# Patient Record
(demographics unavailable — no encounter records)

---

## 2019-05-29 NOTE — OP
DATE OF PROCEDURE:  05/29/2019



PREOPERATIVE DIAGNOSIS:  A 71-year-old female with history of high-grade

transitional cell carcinoma of the bladder, status post transurethral resection 
of a

bladder tumor with history of lamina propria invasion, negative for muscle 
invasion. 



POSTOPERATIVE DIAGNOSIS:  Left lateral bladder tumor recurrence about 2.5 cm 
lateral

to the left ureteral orifice. 



PROCEDURE PERFORMED:  Cystoscopy, transurethral resection of bladder tumor,

intravesical mitomycin-C. 



ANESTHESIA:  General.



COMPLICATIONS:  None apparent.



DISPOSITION:  To recovery room in stable condition.



IV FLUIDS:  800 mL.



EBL:  Minimal.



SPECIMEN:  TUR of lateral bladder tumor.

1. Superficial.

2. Deep.



INTRAOPERATIVE FINDINGS:  Bilateral UOs with clear efflux of urine pre and post

TURBT, mildly patulous bilaterally.  Solitary left lateral bladder tumor with 
smooth

surface, exophytic, measuring about 2.5 cm lateral to the left UO.  No other 
lesions

found. 



INDICATIONS FOR PROCEDURE AND HISTORY:  Ms. Molina is a 71-year-old female, 
who

initially presented last year with obstructing UVJ stone, urosepsis, DIC.  She 
was

monitored in the ICU.  She underwent bilateral ureteroscopy, laser lithotripsy, 
and

stone resolution.  Incidentally on cystoscopy, she was found to have trigonal 
lesion

and underwent TURBT, subsequently after her urosepsis resolved.  This 
demonstrated

high-grade T1 lamina propria invasion bladder tumor.  She declined BCG 
treatment and

previously had discussed regarding indications due to high-grade bladder tumor.

Last cystoscopy demonstrated a bladder lesion.  Therefore, advised regarding 
TURBT.

Cytology obtained was hypocellular.  Indications for surgery reviewed with her 
in

detail and risks and complications including, but not limited to, bleeding, pain
,

infection, injury to adjacent organs, clot retention, bladder perforation, 
injury to

ureter, adjacent structures.  Questions answered to her satisfaction and she 
desired

to proceed. 



DESCRIPTION OF PROCEDURE:  After an informed consent was signed, the patient was

taken to the operating room, placed in a dorsal lithotomy position with the 
genital

area prepped and draped in the usual surgical sterile fashion.  A 21-Lithuanian

cystoscope was utilized for cystoscopy with the 30 and a 70-degree lens.  I only

found the previous left lateral bladder tumor about 2.5 cm left lateral wall, 
which

appeared to have a smooth surface.  The bladder tumor was approximately 5 to 6 
mm

lateral to the left UO.  Bilateral clear efflux of urine was noted.  At this 
time,

we transitioned to a 26-Lithuanian resectoscope and TURBT of the bladder tumor was

performed.  I initially resected the tumor flush to the bladder mucosa.  This 
was

sent as superficial, and subsequently, we obtained some deep specimen for muscle

specimen.  It did not have a typical appearance of a papillary lesion.  Good

hemostasis was obtained.  Bilateral clear efflux of urine was noted, status post

transurethral resection of bladder tumor.  At this time, we placed a 20-Lithuanian 3
-way

Jenkins catheter, 30 mL insufflated.  Bladder was completely emptied.  We then

subsequently placed 40 mg of mitomycin-C and 40 mL of water into the bladder.  
She

will be monitored in PACU with retention for 1 hour and initiate CBI.  
Anticipate

the patient will be discharged after CBI discontinued and monitor for degree of

hematuria.  She is discharged with short course of Loman 5/325, #20; Colace one 
p.o.

b.i.d., Azo p.r.n., ciprofloxacin until followup appointment, oxybutynin 10 mg 
XL

one p.o. daily.  She will follow up with me next Thursday for catheter removal 
and

to review pathology. 







Job ID:  [id number]



[facility]

## 2019-07-31 NOTE — OP
DATE OF PROCEDURE:  07/31/2019



PREOPERATIVE DIAGNOSIS:  A 72-year-old female with history of high-grade

transitional cell carcinoma of the bladder, status post transurethral resection 
of

bladder tumor, pathologic T1, recent recurrence left lateral wall, 2.5 cm,

demonstrating recurrence of high-grade T1 disease. 



POSTOPERATIVE DIAGNOSIS:  A 72-year-old female with history of high-grade

transitional cell carcinoma of the bladder, status post transurethral resection 
of

bladder tumor, pathologic T1, recent recurrence left lateral wall, 2.5 cm,

demonstrating recurrence of high-grade T1 disease. 



PROCEDURES PERFORMED:  Cystoscopy, catheterization of the left ureter, 
transurethral

resection of bladder tumor, resection of bladder tumor bed, intravesical post

transurethral resection of bladder tumor mitomycin-C instillation. 



ANESTHESIA:  General.



COMPLICATIONS:  None apparent.



DISPOSITION:  To recovery room in stable condition.



SPECIMEN:  TUR of tumor bed, deep.



INDICATIONS FOR PROCEDURE AND HISTORY:  Ms. Molina is a 71-year-old female, 
who

initially presented with severe sepsis DIC, had found to have a UVJ stone.  She

underwent treatment of her stones, incidentally was found to have a trigonal 
lesion,

underwent TURBT demonstrating high-grade T1 lamina propria and invasive bladder

tumor.  She declined BCG treatment.  She has been surveyed with 3-month interval

cystoscopy.  Her last cystoscopy demonstrated lesion recurrence and underwent 
TURBT,

May 29, 2019.  This demonstrated T1 disease, lamina propria invasion; however, 
no

muscle was found.  Therefore, she presents today for restaging TURBT.  
Indications

for restaging reviewed and she desired to proceed.  Risks, complications, and

indications were reviewed with her in detail.  Risks and complications including
,

but not limited to:  Bleeding, pain, infection, injury to adjacent organs, 
bladder

perforation, sepsis, ureteral injury, hematuria, clot retention were reviewed, 
and

she desired to proceed. 



DESCRIPTION OF PROCEDURE:  After an informed consent was signed, the patient was

taken to the operating room, placed in a dorsal lithotomy position with the 
genital

area prepped and draped in the usual surgical sterile fashion.  A 21-Nigerien

cystoscope with a 30- and 70-degree lens were utilized to survey the bladder.  
Upon

entering the bladder, the UOs were identified, mildly patulous bilaterally and 
clear

efflux was noted.  At the left lateral wall, about 1 cm lateral to the left UO,

previous resection site was noted with granulomatous reaction.  I did not see 
any

obvious tumor recurrence.  At this time, I intubated the left UO, to protect the

left ureteral orifice in the intramural ureter as the tumor course projects in 
that

region.  With the open-ended catheter intubated in the left UO, I was easily 
able to

pass a 26-Nigerien resectoscope uneventfully.  Using a Gyrus bipolar, we took 
down the

previous resection site, which demonstrated granulomatous reaction.  Deep 
resection

was performed.  She is elderly, frail in nature, and her bladder is attenuated.
  I

did take muscle specimen, which was visually consistent with muscle fibers.  
Small

amount of perivesical fat was seen.  I did not aggressively resect further due 
to

her frail elderly nature and attenuated bladder mucosa.  Good hemostasis with 
gyrus

was obtained.  Clear efflux from bilateral UOs were noted after TUR performed.

Surface area that was resected, was about 2.5 cm.  We then placed a 20-Nigerien 3-
way

30 mL catheter on CBI.  CBI port was plugged, and I instilled 40 mg of mitomycin
-C

and 40 mL of water, and we will hold retention for 1 hour and postop and 
monitor her

on CBI.  If urine output clear off CBI, she will be discharged. 



She will follow up with me next Wednesday for specimen review and pathology.  
She

was discharged with Colace 100 mg 1 p.o. b.i.d.; Macrobid 100 mg one p.o. 
b.i.d. x7

days to start August 1; oxybutynin 10 mg one p.o. daily XL #30 provided for 
bladder

spasm. 







Job ID:  [id number]



[facility]

## 2019-07-31 NOTE — RAD
RETROGRADE PYELOGRAM:

 

Date:  07/31/19 

 

HISTORY:  

Malignant neoplasm of bladder, unspecified. 

 

COMPARISON:  

11/28/18. 

 

FINDINGS:

 film demonstrates no overt calculus. Documentation image of left ureteral stent placed up to ap
proximately the L4-5 level. 

 

IMPRESSION: 

Image documentation of a left ureteral stent with tip extending up to approximately the L4-5 level. 

 

 

POS: [facility]

## 2020-01-16 NOTE — CT
CT ABDOMEN WITH AND WITHOUT CONTRAST

CT PELVIS WITH AND WITHOUT CONTRAST:



DATE:

1/16/2020



HISTORY:

72-year-old female with bladder cancer, status post bladder scraping surgery x2. Follow-up.



COMPARISON:

1/4/2019



TECHNIQUE:

IV injection of iodinated contrast media: administered.

Oral contrast media:Not administered.

Precontrast scan, nephrographic-venous phase scan, and excretory/pyelographic phase scan, performed t
hrough entire abdomen and pelvis.

Coronal and sagittal reconstructions of excretory/pyelographic phase scan.



FINDINGS:

Diffuse, nonspecific mild mural thickening of urinary bladder, similar in appearance to previous CT. 
Uncertain how much of this is due to incomplete distention. No evidence of obvious moderate sized

or large neoplastic tumor in the urinary bladder.

No hydroureteronephrosis.

There has been interval growth of a previously punctate 1 mm calculus at left renal lower pole calyx,
 currently measuring 4 x 2 mm. Adjacent to that, there is a punctate 1 mm calculus. No calculus

identified in the contralateral right kidney, ureters, or bladder.

Absent uterus and absent gallbladder.

Small sliding hiatal hernia.

No high-grade hydronephrosis.

No primary renal neoplasm.

No abdominal aortic aneurysm.

Biconcave broad depressions of superior and inferior endplates throughout all visualized levels of amy
mbar spine and lower thoracic spine. Multilevel bilateral facet DJD in lumbar spine.

No ascites, pneumoperitoneum, colonic diverticulitis, or small bowel dilation.

No major pathology of abdominal aorta, liver, spleen. Bilateral adrenal calcifications unchanged. 1 c
m right adrenal adenoma.

Atrophic, fatty replaced pancreas.

Diffuse severe osteopenia..



IMPRESSION:

1) nonspecific appearance of the urinary bladder. No obvious bladder tumor recurrence identified.

2) nephrolithiasis with interval growth of a now 4 mm left renal calculus.

3) multiple osteoporotic compression and burst fractures throughout the lumbar spine, at least most o
f which are old.

4) small sliding hiatal hernia.

5) status post cholecystectomy and hysterectomy.



Reported By: Evan Paulino 

Electronically Signed:  1/16/2020 10:01 AM

## 2020-05-13 NOTE — OP
DATE OF PROCEDURE:  05/13/2020



PRIMARY CARE PHYSICIAN:  Dr. Humaira Walker.



PREOPERATIVE DIAGNOSIS:  A 72-year-old female with history of high-grade T1

transitional cell carcinoma of the bladder with recurrence. 



POSTOPERATIVE DIAGNOSIS:  A 72-year-old female with history of high-grade T1

transitional cell carcinoma of the bladder with recurrence. 



PROCEDURES PERFORMED:  Cystoscopy, transurethral resection of bladder tumor,

intravesical post transurethral resection mitomycin-C instillation. 



ANESTHESIA:  LMA.



COMPLICATIONS:  None apparent.



SPECIMEN:  TUR of bladder tumor, superficial, deep, sent separately.



ESTIMATED BLOOD LOSS:  Minimal.



INDICATIONS FOR PROCEDURE:  Ms. Molina is a 72-year-old female with history of

high-grade T1 TCC of the bladder.  I have informed her regarding indications 
for BCG

to decrease recurrence and progression, which she has continued to decline.  On

recent cystoscopy, she has had recurrence and presents today for TURBT.  Risks 
and

complications of procedure were reviewed with her in detail including, but not

limited to, bleeding, pain, infection, injury to adjacent organs, urosepsis, 
bladder

perforation, and ureteral or bladder injury discussed.  All questions answered 
to

her satisfaction and she desired to proceed. 



DESCRIPTION OF PROCEDURE:  After an informed consent was signed, the patient was

taken to the operating room, placed in a dorsal lithotomy position with the 
genital

area prepped and draped in the usual surgical sterile fashion.  A 21-Nepali

cystoscope was utilized with a 30 and a 70-degree lens.  The only solitary 
lesion we

saw was a right anterior lateral bladder tumor and lateral to the right UO.  
The UOs

were kept out of harm's way at all times.  At this time, we transitioned to a

continuous resectoscope sheath 26-Nepali with a visual obturator.  Subsequently,

using a Gyrus bipolar, we resected the bladder tumor, superficial and deep and 
sent

specimen separately.  The tumor appeared to be sessile; superficial, and deep

bladder resection was performed.  Due to her advanced age, her bladder was very

attenuated.  We did resect to the level of the bladder muscle and the muscle 
fibers

were seen with a deep resection.  I did not aggressively resect given its 
location

and her advanced age and attenuated bladder mucosa, but felt that we had enough

specimen for deep specimen.  Good hemostasis was obtained.  No bleeding was

appreciated with the water and pressure off.  At this time, a 20-Nepali 3-way 
Jenkins

catheter, 30 mL balloon, with 20 mL instilled was placed into the bladder.  CBI

tubing was attached, clamped for now and intravesical mitomycin-C 40 mg was

instilled into the bladder.  We will hold retention for hour, per protocol and 
start

CBI.  Subsequently, we will discharge the patient after mitomycin-C complete 
and no

significant gross hematuria of concern.  She is discharged with Omnicef for 7 
days,

Colace b.i.d., and p.r.n. Azo, she did have significant bladder spasms with 
previous

resection, she is currently on VESIcare 5 mg one p.o. b.i.d. by her PCP and 
feels

that it is working better.  She may continue to do so.  She will see me next 
Tuesday

to have catheter removal and review pathology. 







Job ID:  [id number]



MTDD

## 2020-05-19 NOTE — PQF
[facility]

POST DISCHARGE CLINICAL DOCUMENTATION IMPROVEMENT CLARIFICATION FORM 



 l



 Todays Date: 05/18/20

  l

Patients Name VALE CALDERON

MRN [id number]

  l

Acct # [id number]

  l

Admit Date 05/13/20

  l

Disch Date 05/13/20









  Name Randa Rowe

Email: [email or web address]

Cell: [phone] 

 

 





To be completed by : 







Present Clinical Indicators - Signs / Symptoms Results and Location in Medical 
Record 

 

[ ] Documentation of: 

 

[ ]  

 

[ ] Documentation of: 

 

[ ]  

 

[ ] Documentation of: 

 

[ ]  

 

[ ] Documentation of: 

 

[ ]  

 

[ ]  

 

Risks  

 

[ ]  

 

[ ]  

 

[ ]  

 

Treatment  

 

[ X] Bladder tumor: 2 cm 

 Query for size of resected bladder tumor (cm)

 

[ ]  

 

[ ]  













To be completed by Physician:  

GIA GORMAN   



The documentation in this patients record requires clarification to ensure 
coding compliance and accuracy. 



Check the appropriate box and include in your discharge summary. 

[ ] _____________________________________________

[ ] Please check this box if this does not apply to this patient

[ ] Unable to determine

[ ] Other diagnosis: ________________________________



Review the following information and exercise your independent professional 
judgment in responding to the clarification. Based upon the clinical findings, 
risk factors, and treatment, please clarify if you are treating one of the 
above probable or suspected diagnoses. 





Physician Signature: Jono Castro____________________________ Date________
5/29/2020______ Time________________
[facility]

## 2020-06-19 NOTE — RAD
XR Chest Pa   Lat STANDARD



History: Preop evaluation



Comparison: Chest CT January 2019



Findings: Continues to be dilatation of the ascending aorta and ectatic transverse aorta. Mild scarri
ng left lateral costophrenic sulcus. No confluent airspace consolidation, pneumothorax or effusion.

No acute osseous abnormality. Multiple wedge compression deformities thoracolumbar spine.



Impression: No acute intrathoracic abnormality. Chronic findings.



Reported By: Aleksandr Sellers 

Electronically Signed:  6/19/2020 2:23 PM

## 2020-06-24 NOTE — OP
DATE OF PROCEDURE:  06/24/2020



PREOPERATIVE DIAGNOSIS:  A 72-year-old female with history of high-grade T1

transitional cell carcinoma. 



POSTOPERATIVE DIAGNOSIS:  A 72-year-old female with history of high-grade T1

transitional cell carcinoma. 



PROCEDURES PERFORMED:  Cystoscopy, transurethral resection of the previous 
tumor bed.



ANESTHESIA:  General.



COMPLICATIONS:  None apparent.



DISPOSITION:  To recovery room in stable condition.



SPECIMEN:  TUR of previous tumor bed, right anterolateral wall, tumor bed 
resected

final surface area about 2-2.5 cm. 



INDICATIONS FOR PROCEDURE AND HISTORY:  Ms. Molina is a 72-year-old female, 
who is

well known to me with history of high-grade T1 TCC.  She had previously 
declined a

BCG intravesical therapy despite my recommendations.  As she presented initially

very frail, with urosepsis, the patient and family declined.  As she has had

recurrence, they are considering to proceed with BCG as I previously 
recommended.

As her previous recurrence was resected, demonstrating high-grade, she presents

today for deep tumor resection of the tumor bed.  Risks and complications of the

procedure were reviewed with her in detail including, but not limited to, 
bleeding,

pain, infection, injury to adjacent organs, bladder perforation, sepsis, all

questions answered to her satisfaction, and she desired to proceed. 



DESCRIPTION OF PROCEDURE:  After an informed consent was signed, the patient was

taken to the operating room, placed in a dorsal lithotomy position with the 
genital

area prepped and draped in the usual surgical sterile fashion.  A 21-Central African

cystoscope was utilized for cystoscopy, which demonstrated normal urethra.  Upon

entering the bladder, there was no evidence of visible tumor recurrence.  The 
UOs

were identified in normal orthotopic position.  The left UO was mildly patulous.

Using a 30-degree and a 70-degree lens, we confirmed that there was no tumor

recurrence.  The previous resection site was somewhat subtle as she was already

healed.  This was located in the right anterolateral wall, caudad to the right

ureteral orifice.  The UOs were well away from the resection site and kept out 
of

harm's way.  We then transitioned to a 26-Central African resectoscope gyrus bipolar.  We

took a resection from the previous tumor bed deep.  I was able to see the muscle

fibers and beginning to repair the perivesical fat indicating that we were deep

enough.  Her bladder was very attenuated, and then given her advanced age and I 
did

not further pursue as there was no recurrence and I felt that I got enough this

specimen.  The edges were cauterized with bipolar.  She tolerated the procedure

well.  An 18-Central African 10 mL urethral Jenkins catheter was attached to leg bag.  She 
is

discharged with Omnicef 300 mg one p.o. b.i.d. until followup appointment.  She 
will

return to clinic for Jenkins catheter removal in about 2 to 3 weeks.  We will 
initiate

her induction BCG. 







Job ID:  [id number]



MTDD

## 2020-12-03 NOTE — EKG
Test Reason : 

Blood Pressure : ***/*** mmHG

Vent. Rate : 088 BPM     Atrial Rate : 088 BPM

   P-R Int : 158 ms          QRS Dur : 086 ms

    QT Int : 384 ms       P-R-T Axes : 054 -29 019 degrees

   QTc Int : 464 ms

 

Normal sinus rhythm

Nonspecific ST abnormality

Abnormal ECG

When compared with ECG of 24-OCT-2020 05:00,

T wave inversion now evident in Inferior leads

Nonspecific T wave abnormality now evident in Anterior leads

T wave inversion no longer evident in Lateral leads

QT has shortened

Confirmed by DR. LAURIE FRAGA (3) on 12/3/2020 7:06:29 AM

 

Referred By:  JADIEL           Confirmed By:DR. LAURIE FRAGA

## 2020-12-07 NOTE — OP
DATE OF PROCEDURE:  12/07/2020



PREOPERATIVE DIAGNOSES:  

1. A 73-year-old female with history of transitional cell carcinoma of the 
bladder,

initially diagnosed in November 2018, high-grade T1, status post induction BCG. 

2. Left renal lithiasis:  8 mm renal pelvic, lower pole 6 to 7 mm on prior CT.

3. Recent CT demonstrating an 8 to 9 mm renal pelvic stone, left distal 2 mm UVJ

stone, left lower pole 3 mm to 4 mm calculi x2. 

4. Mild left hydronephrosis.

5. Right mild hydronephrosis on ultrasound, ruled out on CT.

6. Constipation.



POSTOPERATIVE DIAGNOSES:  

1. A 73-year-old female with history of transitional cell carcinoma of the 
bladder,

initially diagnosed in November 2018, high-grade, T1, status post induction, 
BCG. 

2. Left renal lithiasis:  8 mm renal pelvic, lower pole 6 to 7 mm on prior CT.

3. Recent CT demonstrating an 8 to 9 mm renal pelvic stone, left distal 2 mm UVJ

stone, left lower pole 3 mm to 4 mm calculi x2. 

4. Mild left hydronephrosis.

5. Right mild hydronephrosis on ultrasound, ruled out on CT.

6. Constipation.



PROCEDURES PERFORMED:  Cystoscopy, left retrograde pyelogram, left ureteroscopy,

laser lithotripsy of renal calculi multiple, basket extraction of stone 
fragments,

4.8 x 26 double-J ureteral stent with distal tail in situ, basket extraction of

stone debris, retrograde pyelogram,  fecal disimpaction. 



ANESTHESIA:  General.



COMPLICATIONS:  None apparent.



SPECIMEN:  Stone for chemical analysis.



INTRAOPERATIVE FINDINGS:  

1. No evidence of bladder tumor on cystoscopy using a 30 and 70-degree lens.

2. Bilateral UOs somewhat deviated mildly laterally.  Bilateral patulous UOs 
with

clear efflux. 

3. Low compliance bladder, bilateral reflux on retrograde pyelogram.

4. Multiple left renal calculi, dimensions as above, appear soft in nature.

5.  Fecal impaction



INDICATIONS FOR PROCEDURE AND HISTORY:  Ms. Molina is a 73-year-old female 
with

history of high-grade T1 TCC of the bladder status post TURBT, prior history of

kidney stones.  She has previously been undergoing observation with staging

cystoscopies, however, due to recurrence, she has finally agreed to proceed with
her

BCG, which she had declined in the past.  She has completed her induction BCG,

however, has difficulty finishing her maintenance #1 dose of BCG, which she 
finished

1/3 maintenance doses, however, difficulty tolerating due to severe bladder 
spasms

and low capacity bladder.  Staging CT demonstrated left renal calculi, 
dimensions as

above, and she presents today for treatment and cystoscopy surveillance for her

bladder tumor.  Risks and complications of the procedures have been fully 
discussed

with the patient including, but not limited to:  Bleeding; pain; infection; 
injury

to adjacent organs; urosepsis; ureteral, renal, kidney injury; stricture 
formation.

All questions were answered to her satisfaction and she desired to proceed. 



DESCRIPTION OF PROCEDURE:  After an informed consent was signed, the patient was

taken to the operating room, placed in a dorsal lithotomy position with the 
genital

area prepped and draped in the usual surgical sterile fashion.  The patient was

placed in dorsal lithotomy position and broad-spectrum antibiotics were 
provided.  A

21-[place] cystoscope was utilized for cystoscopy using a 30 and 70-degree lens,

which demonstrated no evidence of inflammatory lesion, no tumor seen.  There was

mild weepiness of the bladder mucosa with distention of the bladder.  However, 
there

was no erythema or papillary lesion concerning for bladder tumor recurrence.  
The

UOs were mildly deviated laterally, bilaterally.  Patulous UOs on both sides 
noted

with clear efflux of urine.  No stone in the bladder was seen.  At this time, an

open-ended catheter was utilized to intubate the left UO and a retrograde 
pyelogram

performed demonstrating reflux into the right collecting system with a filling 
of

the bladder, which was nondilated, and left retrograde pyelogram demonstrated 
mildly

patulous ureter, however, no filling defect and no significant hydronephrosis.

0.38 Sensor wire was placed into the left upper pole and a rigid ureteroscope

was advanced without significant issues.  I was able to pass the rigid 
ureteroscope

to the level of the UPJ, proximal ureter without significant issues.  No stones 
were

seen.  At this time 0.35 Super Stiff wire was placed into the left upper pole

and a dual-lumen access sheath was passed to the level of the proximal ureter.

After it was removed, a 12 x 14-[place] navigator was able to be placed into the

proximal ureter without difficulty.  Flexible ureteroscope was then advanced via
the

navigator and survey of the collecting system demonstrated the stone moiety

consistent with CT.  The stones appeared to be quite soft.  They fragmented very

easily into powder like dust debris.  Those that were amendable to be basket

extracted were removed.  What remained in situ were too small to basket or 
laser.

At this time, the survey of the collecting system demonstrated no evidence of

obvious stone nidus of concern, ureteral stone and trauma were ruled out.  A 4.8
x

26 double-J ureteral stent with distal tail was left in situ with good 
redundancy in

the bladder and the kidney.  As she had severe bladder spasms, 4.8-[place]

stent  placed.  I did place a B and O suppository.  However, she was found to 
have severe

fecal impaction on KUB as well as on physical exam.  Therefore, I did disimpact 
what

was in her rectal vault and placed the B and O suppository in her rectum.  
Significant amount of stool removed by manually.  She

tolerated the procedure well and transported to the recovery room in stable

condition.  She will follow up with me next Thursday for stent pull, cystoscopy,
and

we will resume her 3-month surveillance cystoscopy.  Upon followup, we will need
to

discuss, if she desires to proceed with maintenance BCG, as it has improved her

recurrence rate. 







Job ID:  [id number]



[facility]

## 2020-12-07 NOTE — CT
A CT OF THE ABDOMEN AND PELVIS WITHOUT IV CONTRAST



INDICATION: History of renal stones and burning upon urination



COMPARISON: January 16, 2020



FINDINGS: The lack of IV contrast limits evaluation of the solid organs of the abdomen and pelvis.





ABDOMEN:



Lung bases: There is stable mild subpleural scarring involving the right lung base.



Liver: No focal lesion.



Gallbladder:  Not visualized, presumably surgically absent



Pancreas: Mild fatty replacement is stable



Adrenal glands: There are calcifications of the adrenal glands bilaterally may reflect sequela of hank
or trauma or infection. This is stable to the prior exam.



Spleen: Normal.



Kidneys and ureters: There is an 8.9 mm stone within the inferior pole of the left kidney. An additio
nal 2.5 and 2.4 mm stone are seen within the inferior pole of the left kidney. There are punctate

stones, approximately 4 in number, involving the inferior pole of the left kidney measuring approxima
tely 1 mm. There is very slight prominence of the left renal collecting system. There is a very

tiny stone within the distal left ureter measuring 2 mm, approximately 4 mm proximal to the left UVJ.
 Unopacified right kidney reveals no acute abnormality. No hydronephrosis is noted.



Vasculature: Normal.



Lymph nodes:No lymphadenopathy.



Free fluid in abdomen:No free fluid is evident.





PELVIS:



Small and large bowel: There is mild amount retained stool within the colon. The appendix is not defi
nitely seen. Small bowel is of normal caliber.



Appendix:The appendix is not definitely seen



Bladder: Partially decompressed. There is nonspecific mild perivesicular fat stranding.



Rectal and perirectal soft tissues:Normal.



Reproductive structures: Surgically absent



Free fluid in pelvis: No free fluid is evident.



Lymphadenopathy pelvis: No lymphadenopathy is evident.



Osseous structures: There are multiple stable chronic compression abnormalities involving T11-L5. The
re is diffuse osteopenia.  There is scattered degenerative and osteoarthritic changes.



Soft tissues:Normal.



IMPRESSION:

1. 2 mm distal left ureteral calculus with very mild left hydroureter. Left nephrolithiasis.

2. Partially decompressed bladder with mild perivesicular fat stranding. Recommend correlation for po
ssible cystitis.

3. Multiple stable compression abnormalities of T11-L5.



Reported By: Gaetano Anderson 

Electronically Signed:  12/7/2020 7:56 AM

## 2020-12-07 NOTE — RAD
RETROGRDE PYELOGRAM IVP:

A total of 18 fluoroscopic images are presented from the OR.

 

INDICATION: 

Fluoroscopic imaging during intraoperative procedure with left ureteral stent placement.

 

FINDINGS/IMPRESSION: 

These images demonstrate catheterization and opacification of the left collecting structures.  The fi
nal image demonstrates placement of a left ureteral stent.  

 

POS: [facility]